# Patient Record
Sex: MALE | Race: BLACK OR AFRICAN AMERICAN | Employment: STUDENT | URBAN - METROPOLITAN AREA
[De-identification: names, ages, dates, MRNs, and addresses within clinical notes are randomized per-mention and may not be internally consistent; named-entity substitution may affect disease eponyms.]

---

## 2017-07-26 ENCOUNTER — HOSPITAL ENCOUNTER (OUTPATIENT)
Facility: HOSPITAL | Age: 22
Setting detail: OBSERVATION
Discharge: HOME | End: 2017-07-27
Admitting: HOSPITALIST
Payer: COMMERCIAL

## 2017-07-26 DIAGNOSIS — F22 DELUSION (HCC): ICD-10-CM

## 2017-07-26 DIAGNOSIS — R00.0 SINUS TACHYCARDIA: ICD-10-CM

## 2017-07-26 DIAGNOSIS — R45.1 AGITATION: ICD-10-CM

## 2017-07-26 DIAGNOSIS — R41.82 ALTERED MENTAL STATUS, UNSPECIFIED: Primary | ICD-10-CM

## 2017-07-27 ENCOUNTER — APPOINTMENT (OUTPATIENT)
Dept: GENERAL RADIOLOGY | Facility: HOSPITAL | Age: 22
End: 2017-07-27
Attending: HOSPITALIST
Payer: COMMERCIAL

## 2017-07-27 ENCOUNTER — APPOINTMENT (OUTPATIENT)
Dept: GENERAL RADIOLOGY | Facility: HOSPITAL | Age: 22
End: 2017-07-27
Payer: COMMERCIAL

## 2017-07-27 ENCOUNTER — APPOINTMENT (OUTPATIENT)
Dept: CT IMAGING | Facility: HOSPITAL | Age: 22
End: 2017-07-27
Attending: HOSPITALIST
Payer: COMMERCIAL

## 2017-07-27 ENCOUNTER — APPOINTMENT (OUTPATIENT)
Dept: CT IMAGING | Facility: HOSPITAL | Age: 22
End: 2017-07-27
Payer: COMMERCIAL

## 2017-07-27 VITALS
DIASTOLIC BLOOD PRESSURE: 66 MMHG | WEIGHT: 160 LBS | BODY MASS INDEX: 22.4 KG/M2 | SYSTOLIC BLOOD PRESSURE: 114 MMHG | HEIGHT: 71 IN | OXYGEN SATURATION: 98 % | TEMPERATURE: 99 F | HEART RATE: 51 BPM | RESPIRATION RATE: 15 BRPM

## 2017-07-27 PROBLEM — R45.1 AGITATION: Status: ACTIVE | Noted: 2017-07-27

## 2017-07-27 PROBLEM — R00.0 SINUS TACHYCARDIA: Status: ACTIVE | Noted: 2017-07-27

## 2017-07-27 PROBLEM — F22 DELUSION (HCC): Status: ACTIVE | Noted: 2017-07-27

## 2017-07-27 PROBLEM — R41.82 ALTERED MENTAL STATUS, UNSPECIFIED: Status: ACTIVE | Noted: 2017-07-27

## 2017-07-27 LAB
AMPHETAMINE URINE: NEGATIVE
BARBITURATES URINE: NEGATIVE
BASOPHILS # BLD AUTO: 0.04 X10(3) UL (ref 0–0.1)
BASOPHILS NFR BLD AUTO: 0.7 %
BENZODIAZEPINES URINE: NEGATIVE
BILIRUB UR QL STRIP.AUTO: NEGATIVE
BUN BLD-MCNC: 17 MG/DL (ref 8–20)
CALCIUM BLD-MCNC: 8.7 MG/DL (ref 8.3–10.3)
CHLORIDE: 106 MMOL/L (ref 101–111)
CLARITY UR REFRACT.AUTO: CLEAR
CO2: 22 MMOL/L (ref 22–32)
COCAINE URINE: NEGATIVE
COLOR UR AUTO: YELLOW
CREAT BLD-MCNC: 1.24 MG/DL (ref 0.7–1.3)
EOSINOPHIL # BLD AUTO: 0.11 X10(3) UL (ref 0–0.3)
EOSINOPHIL NFR BLD AUTO: 1.9 %
ERYTHROCYTE [DISTWIDTH] IN BLOOD BY AUTOMATED COUNT: 12.8 % (ref 11.5–16)
EST. AVERAGE GLUCOSE BLD GHB EST-MCNC: 100 MG/DL (ref 68–126)
ETHYL ALCOHOL: <3 MG/DL (ref ?–3)
GLUCOSE BLD-MCNC: 153 MG/DL (ref 70–99)
GLUCOSE BLD-MCNC: 170 MG/DL (ref 65–99)
GLUCOSE UR STRIP.AUTO-MCNC: NEGATIVE MG/DL
HBA1C MFR BLD HPLC: 5.1 % (ref ?–5.7)
HCT VFR BLD AUTO: 39.2 % (ref 37–53)
HGB BLD-MCNC: 13.8 G/DL (ref 13–17)
IMMATURE GRANULOCYTE COUNT: 0.01 X10(3) UL (ref 0–1)
IMMATURE GRANULOCYTE RATIO %: 0.2 %
KETONES UR STRIP.AUTO-MCNC: NEGATIVE MG/DL
LEUKOCYTE ESTERASE UR QL STRIP.AUTO: NEGATIVE
LYMPHOCYTES # BLD AUTO: 2.39 X10(3) UL (ref 0.9–4)
LYMPHOCYTES NFR BLD AUTO: 42 %
MCH RBC QN AUTO: 28.9 PG (ref 27–33.2)
MCHC RBC AUTO-ENTMCNC: 35.2 G/DL (ref 31–37)
MCV RBC AUTO: 82 FL (ref 80–99)
MONOCYTES # BLD AUTO: 0.42 X10(3) UL (ref 0.1–0.6)
MONOCYTES NFR BLD AUTO: 7.4 %
NEUTROPHIL ABS PRELIM: 2.72 X10 (3) UL (ref 1.3–6.7)
NEUTROPHILS # BLD AUTO: 2.72 X10(3) UL (ref 1.3–6.7)
NEUTROPHILS NFR BLD AUTO: 47.8 %
NITRITE UR QL STRIP.AUTO: NEGATIVE
OPIATE URINE: NEGATIVE
PCP URINE: NEGATIVE
PH UR STRIP.AUTO: 5 [PH] (ref 4.5–8)
PLATELET # BLD AUTO: 171 10(3)UL (ref 150–450)
POTASSIUM SERPL-SCNC: 3.7 MMOL/L (ref 3.6–5.1)
PROT UR STRIP.AUTO-MCNC: NEGATIVE MG/DL
RBC # BLD AUTO: 4.78 X10(6)UL (ref 4.3–5.7)
RBC UR QL AUTO: NEGATIVE
RED CELL DISTRIBUTION WIDTH-SD: 38.2 FL (ref 35.1–46.3)
SODIUM SERPL-SCNC: 139 MMOL/L (ref 136–144)
SP GR UR STRIP.AUTO: 1.01 (ref 1–1.03)
TSI SER-ACNC: 0.56 MIU/ML (ref 0.35–5.5)
UROBILINOGEN UR STRIP.AUTO-MCNC: <2 MG/DL
WBC # BLD AUTO: 5.7 X10(3) UL (ref 4–13)

## 2017-07-27 PROCEDURE — 70491 CT SOFT TISSUE NECK W/DYE: CPT | Performed by: HOSPITALIST

## 2017-07-27 PROCEDURE — 99252 IP/OBS CONSLTJ NEW/EST SF 35: CPT | Performed by: OTHER

## 2017-07-27 PROCEDURE — 70360 X-RAY EXAM OF NECK: CPT | Performed by: HOSPITALIST

## 2017-07-27 PROCEDURE — 71010 XR CHEST AP PORTABLE  (CPT=71010): CPT

## 2017-07-27 PROCEDURE — 99220 INITIAL OBSERVATION CARE,LEVL III: CPT | Performed by: HOSPITALIST

## 2017-07-27 PROCEDURE — 70450 CT HEAD/BRAIN W/O DYE: CPT

## 2017-07-27 RX ORDER — POTASSIUM CHLORIDE 14.9 MG/ML
20 INJECTION INTRAVENOUS ONCE
Status: COMPLETED | OUTPATIENT
Start: 2017-07-27 | End: 2017-07-27

## 2017-07-27 RX ORDER — ZIPRASIDONE MESYLATE 20 MG/ML
INJECTION, POWDER, LYOPHILIZED, FOR SOLUTION INTRAMUSCULAR
Status: COMPLETED
Start: 2017-07-27 | End: 2017-07-27

## 2017-07-27 RX ORDER — ONDANSETRON 2 MG/ML
4 INJECTION INTRAMUSCULAR; INTRAVENOUS EVERY 6 HOURS PRN
Status: DISCONTINUED | OUTPATIENT
Start: 2017-07-27 | End: 2017-07-28

## 2017-07-27 RX ORDER — MAGNESIUM HYDROXIDE/ALUMINUM HYDROXICE/SIMETHICONE 120; 1200; 1200 MG/30ML; MG/30ML; MG/30ML
30 SUSPENSION ORAL ONCE
Status: DISCONTINUED | OUTPATIENT
Start: 2017-07-27 | End: 2017-07-27

## 2017-07-27 RX ORDER — SODIUM CHLORIDE 9 MG/ML
INJECTION, SOLUTION INTRAVENOUS ONCE
Status: COMPLETED | OUTPATIENT
Start: 2017-07-27 | End: 2017-07-27

## 2017-07-27 RX ORDER — SODIUM CHLORIDE 9 MG/ML
INJECTION, SOLUTION INTRAVENOUS CONTINUOUS
Status: CANCELLED | OUTPATIENT
Start: 2017-07-27 | End: 2017-07-27

## 2017-07-27 RX ORDER — ACETAMINOPHEN 325 MG/1
650 TABLET ORAL EVERY 6 HOURS PRN
Status: DISCONTINUED | OUTPATIENT
Start: 2017-07-27 | End: 2017-07-28

## 2017-07-27 RX ORDER — LORAZEPAM 2 MG/ML
1 INJECTION INTRAMUSCULAR ONCE
Status: COMPLETED | OUTPATIENT
Start: 2017-07-27 | End: 2017-07-27

## 2017-07-27 RX ORDER — ZIPRASIDONE MESYLATE 20 MG/ML
10 INJECTION, POWDER, LYOPHILIZED, FOR SOLUTION INTRAMUSCULAR ONCE
Status: COMPLETED | OUTPATIENT
Start: 2017-07-27 | End: 2017-07-27

## 2017-07-27 RX ORDER — ACETAMINOPHEN 500 MG
1000 TABLET ORAL ONCE
Status: DISCONTINUED | OUTPATIENT
Start: 2017-07-27 | End: 2017-07-27

## 2017-07-27 NOTE — ED NOTES
Bed assigned. Per [address], Pt is an ER hold until sitter is available after 7 AM. ED Charge RN-Boris person. Pt's Mom at bedside updated on Pt's room assignment.

## 2017-07-27 NOTE — ED NOTES
Called [facility] and spoke to Margy [facility] RN. He was notified that Pt needs evaluation.

## 2017-07-27 NOTE — ED NOTES
Transport & [place], security at bedside to transport pt to floor. Security accompanying pt to floor.

## 2017-07-27 NOTE — ED NOTES
Pt woke and sat up on cart, HR noted to increase to 160's. Pt still disoriented.  Now that Pt is lying down and went back to sleep, Pt's is back to NSR HR-70's

## 2017-07-27 NOTE — ED NOTES
Report given to Paul Karimi RN x [phone] at 1053. Awaits sitter before Patient can be transported to the floor.

## 2017-07-27 NOTE — ED INITIAL ASSESSMENT (HPI)
Pt to ED c/o possible foreign body ingestion. Pt states he swallowed a fly and feels ringing in the ears. Pt presents anxious and trying to hold his breath.

## 2017-07-27 NOTE — ED PROVIDER NOTES
Patient Seen in: [facility] Emergency Department    History   Patient presents with:  Ear Problem Pain (neurosensory)  Sore Throat    Stated Complaint: ear throat pain    HPI    Patient is 22years old and presents to the emergency department with m throat pain  Other systems are as noted in HPI. Constitutional and vital signs reviewed. All other systems reviewed and negative except as noted above. PSFH elements reviewed from today and agreed except as otherwise stated in HPI.     Physical Exa components within normal limits   ETHYL ALCOHOL - Normal   CBC W/ DIFFERENTIAL - Normal   CBC WITH DIFFERENTIAL WITH PLATELET    Narrative: The following orders were created for panel order CBC WITH DIFFERENTIAL WITH PLATELET.   Procedure wave.  No Brugada syndrome. Incomplete right bundle branch, large amplitude consistent with patient's thin body habitus. CT HEAD (without contrast)    IMPRESSION:  No acute intracranial abnormality by CT.      No acute intracranial hemorrhage, ev

## 2017-07-27 NOTE — ED NOTES
[facility] notified by ED RNAntonia that Pt will be admitted. No crisis eval needed here in the ED at this time.

## 2017-07-27 NOTE — H&P
[facility]  History and Physical     Fernando Chandler Patient Status:  Emergency    1995 MRN [id number]   Location [address] [facility] Attending Araceli Clark MD   Hosp Day # 0 PCP None Pcp     Chief Complaint: Patient pr General: No acute distress. Alert, somnolent  HEENT: Normocephalic atraumatic. Moist mucous membranes. EOM-I. PERRLA. Anicteric. Neck: No lymphadenopathy. No JVD. No carotid bruits. Respiratory: Clear to auscultation bilaterally. No wheezes.  No rhonchi Shaniqua Rawls MD  7/27/2017

## 2017-07-27 NOTE — ED NOTES
Pt's Mom at bedside provided with pillow and warm blanket. She was also updated on plan of care - re:awaits [facility] eval, no ETA given.

## 2017-07-27 NOTE — ED NOTES
@ 0022 - Pt removed rt AC saline lock and removed cardiac monitor cables. Pt is trying to get out of bed, becoming agitated, combative and hallucinating more. [facility] [place] [employer] x 2 at bedside.  ER MD-Dr. Jennifer Jin with orders to place Patient in [facility]

## 2017-07-27 NOTE — ED NOTES
@ 0008 [facility] MD-Dr. Derek Givens at bedside for eval. Pt noted having hallucinations and delusions, stating that the buzzing is now more to the right ear. Pt kept opening his mouth wide and then pinching nose and trying to hold his breath.  Pt states he's trying

## 2017-07-28 LAB
ATRIAL RATE: 94 BPM
P AXIS: 76 DEGREES
P-R INTERVAL: 174 MS
Q-T INTERVAL: 358 MS
QRS DURATION: 106 MS
QTC CALCULATION (BEZET): 447 MS
R AXIS: 89 DEGREES
T AXIS: 71 DEGREES
VENTRICULAR RATE: 94 BPM

## 2017-07-28 NOTE — PROGRESS NOTES
NURSING DISCHARGE NOTE    Discharged Home via Ambulatory. Accompanied by Family member  Belongings Taken by patient/family. When started shift patient's mother was asking about patients most recent CT of neck.  Mother was informed that per the CT re

## 2017-07-28 NOTE — CONSULTS
[facility]    PATIENT'S NAME: Roberta Diamond   ATTENDING PHYSICIAN: Porfirio Sicard, M.D. Renda Mustache: Lynda Joseph MD   PATIENT ACCOUNT#:  [id number]  MEDICAL RECORD #:   [id number]       YOB: 1995  ADMISSION DATE: mom for the summer. PAST PSYCHIATRIC HISTORY:  None. Denies alcohol/drug use, but drug screen positive for marijuana.     PAST MEDICAL HISTORY:  Essentially noncontributory, other than his complaints that have already been noted in regard to his throa

## 2021-05-17 ENCOUNTER — IMMUNIZATION (OUTPATIENT)
Dept: LAB | Age: 26
End: 2021-05-17

## 2021-05-17 DIAGNOSIS — Z23 NEED FOR VACCINATION: Primary | ICD-10-CM

## 2021-05-17 PROCEDURE — 91300 COVID 19 PFIZER-BIONTECH: CPT | Performed by: HOSPITALIST

## 2021-05-17 PROCEDURE — 0001A COVID 19 PFIZER-BIONTECH: CPT | Performed by: HOSPITALIST

## 2021-06-07 ENCOUNTER — IMMUNIZATION (OUTPATIENT)
Dept: LAB | Age: 26
End: 2021-06-07
Attending: HOSPITALIST

## 2021-06-07 DIAGNOSIS — Z23 NEED FOR VACCINATION: Primary | ICD-10-CM

## 2021-06-07 PROCEDURE — 91300 COVID 19 PFIZER-BIONTECH: CPT

## 2021-06-07 PROCEDURE — 0002A COVID 19 PFIZER-BIONTECH: CPT

## (undated) NOTE — LETTER
To Whom It May Concern: This certifies that [facility] was admitted to hospital today accompanied by his parent. Please excuse Mignonritylor Brought, his excuse his parent from work today. Do not hesitate to call with any questions or concerns.               E

## (undated) NOTE — LETTER
To Whom It May Concern: This certifies that Luciana Sommer was seen at the hospital today accompanied by his parent. Please excuse Edyta Braden, his parent from work today. Do not hesitate to call with any questions or concerns.               Adriana Still